# Patient Record
Sex: FEMALE | Race: WHITE | NOT HISPANIC OR LATINO | Employment: FULL TIME | URBAN - METROPOLITAN AREA
[De-identification: names, ages, dates, MRNs, and addresses within clinical notes are randomized per-mention and may not be internally consistent; named-entity substitution may affect disease eponyms.]

---

## 2019-05-28 ENCOUNTER — TELEPHONE (OUTPATIENT)
Dept: SCHEDULING | Facility: IMAGING CENTER | Age: 54
End: 2019-05-28

## 2019-07-11 ENCOUNTER — OFFICE VISIT (OUTPATIENT)
Dept: MEDICAL GROUP | Facility: PHYSICIAN GROUP | Age: 54
End: 2019-07-11
Payer: COMMERCIAL

## 2019-07-11 VITALS
BODY MASS INDEX: 42.3 KG/M2 | WEIGHT: 238.76 LBS | HEIGHT: 63 IN | RESPIRATION RATE: 14 BRPM | HEART RATE: 87 BPM | SYSTOLIC BLOOD PRESSURE: 110 MMHG | OXYGEN SATURATION: 98 % | DIASTOLIC BLOOD PRESSURE: 76 MMHG | TEMPERATURE: 98.4 F

## 2019-07-11 DIAGNOSIS — B35.3 ATHLETE'S FOOT, LEFT: ICD-10-CM

## 2019-07-11 DIAGNOSIS — N18.30 CHRONIC RENAL DISEASE, STAGE III (HCC): ICD-10-CM

## 2019-07-11 DIAGNOSIS — E66.01 MORBID OBESITY WITH BMI OF 40.0-44.9, ADULT (HCC): ICD-10-CM

## 2019-07-11 DIAGNOSIS — G56.03 CARPAL TUNNEL SYNDROME, BILATERAL: ICD-10-CM

## 2019-07-11 DIAGNOSIS — I10 ESSENTIAL HYPERTENSION: ICD-10-CM

## 2019-07-11 PROBLEM — K57.30 DIVERTICULOSIS OF LARGE INTESTINE: Status: ACTIVE | Noted: 2019-07-11

## 2019-07-11 PROBLEM — R20.0 NUMBNESS AND TINGLING IN BOTH HANDS: Status: ACTIVE | Noted: 2019-07-11

## 2019-07-11 PROBLEM — D12.6 ADENOMATOUS COLON POLYP: Status: ACTIVE | Noted: 2019-07-11

## 2019-07-11 PROBLEM — R20.2 NUMBNESS AND TINGLING IN BOTH HANDS: Status: ACTIVE | Noted: 2019-07-11

## 2019-07-11 PROCEDURE — 99204 OFFICE O/P NEW MOD 45 MIN: CPT | Performed by: FAMILY MEDICINE

## 2019-07-11 RX ORDER — FLUCONAZOLE 100 MG/1
TABLET ORAL
Qty: 6 TAB | Refills: 0 | Status: SHIPPED
Start: 2019-07-11 | End: 2020-01-28

## 2019-07-11 ASSESSMENT — PATIENT HEALTH QUESTIONNAIRE - PHQ9: CLINICAL INTERPRETATION OF PHQ2 SCORE: 0

## 2019-07-11 NOTE — PROGRESS NOTES
Complaint: Establish care     Subjective:     Cely Taylor is a 54 y.o. female here today to establish care. Previously followed by Dr. Sheriff.    Morbid obesity with BMI of 40.0-44.9, adult ([facility])  Sedentary lifestyle due to long work days.    Essential hypertension  On lisinopril 40 mg qd    Athlete's foot, left  Both she and her  have some foot fungus. He is being treated.    Numbness and tingling in both hands  For past couple months, at night, now getting almost nightly. Works at [employer] all day.    Chronic renal disease, stage III ([facility])  Followed by Dr. Hernandez.     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    Current medicines (including changes today)  Current Outpatient Prescriptions   Medication Sig Dispense Refill   • fluconazole (DIFLUCAN) 100 MG Tab Take 1 tab weekly x 6 weeks 6 Tab 0   • lisinopril (PRINIVIL, ZESTRIL) 40 MG tablet Take 40 mg by mouth every evening.     • omeprazole (PRILOSEC) 20 MG delayed-release capsule Take 20 mg by mouth as needed.       No current facility-administered medications for this visit.      She  has a past medical history of Breath shortness (3/2015); Cold (2/2015); Heart burn; Hypertension; Lipoma of neck; Other specified symptom associated with female genital organs (4/2015); productive cough (4/2015); and Renal disorder.    Health Maintenance: recently had colonoscopy. No need for Pap. Sees dentist and has eyes checked regularly.      Allergies: Ivy leaf [hedera helix]    Current Outpatient Prescriptions Ordered in [facility]   Medication Sig Dispense Refill   • fluconazole (DIFLUCAN) 100 MG Tab Take 1 tab weekly x 6 weeks 6 Tab 0   • lisinopril (PRINIVIL, ZESTRIL) 40 MG tablet Take 40 mg by mouth every evening.     • omeprazole (PRILOSEC) 20 MG delayed-release capsule Take 20 mg by mouth as needed.       No current Epic-ordered facility-administered medications on file.        Past Medical History:   Diagnosis Date   • Breath shortness 3/2015     "due to cold   • Cold 2/2015   • Heart burn    • Hypertension    • Lipoma of neck    • Other specified symptom associated with female genital organs 4/2015    fibroids   • productive cough 4/2015   • Renal disorder     CKD       Past Surgical History:   Procedure Laterality Date   • VAGINAL HYSTERECTOMY SCOPE TOTAL  4/13/2015    Performed by Kim Gardner M.D. at SURGERY SAME DAY [facility]   • SALPINGECTOMY  4/13/2015    Performed by Kim Gardner M.D. at SURGERY SAME DAY [facility]   • CYSTOSCOPY  4/13/2015    Performed by Kim Gardner M.D. at SURGERY SAME DAY [facility]       Social History   Substance Use Topics   • Smoking status: Former Smoker     Packs/day: 0.75     Years: 15.00     Types: Cigarettes     Quit date: 1/1/2000   • Smokeless tobacco: Never Used   • Alcohol use Yes      Comment: 1-2 per week       Social History     Social History Narrative   • No narrative on file       Family History   Problem Relation Age of Onset   • Hypertension Sister          ROS Positive for itchy, red feet; tingling numbness in medial fingers both hands at night, no dropping objects or loss of  strength.  Patient denies any fever, chills, unintentional weight gain/loss, fatigue, stroke symptoms, dizziness, headache, nasal congestion, sore-throat, cough, heartburn, chest pain, difficulty breathing, abdominal discomfort, diarrhea/constipation, burning with urination or frequency, joint or back pain,  depression or anxiety.       Objective:     /76   Pulse 87   Temp 36.9 °C (98.4 °F) (Temporal)   Resp 14   Ht 1.588 m (5' 2.5\")   Wt 108.3 kg (238 lb 12.1 oz)   SpO2 98%  Body mass index is 42.97 kg/m².   Physical Exam:  Constitutional: Alert, no distress.  Skin: Warm, dry, good turgor, no rashes in visible areas.  Eye: Equal, round and reactive, conjunctiva clear, lids normal.  ENMT: Lips without lesions, good dentition, oropharynx clear.  Neck: Trachea midline, no masses, no thyromegaly. No " cervical or supraclavicular lymphadenopathy. Dense sc mass 5 cm across over c7.  Respiratory: Unlabored respiratory effort, lungs clear to auscultation, no wheezes, no ronchi.  Cardiovascular: Normal S1, S2, no murmur, no extremity edema.  Abdomen: Soft, non-tender, no masses, no hepatosplenomegaly.  Psych: Alert and oriented x3, appropriate affect and mood.        Assessment and Plan:   The following treatment plan was discussed    1. Athlete's foot, left  Needs to disinfect shower.  - fluconazole (DIFLUCAN) 100 MG Tab; Take 1 tab weekly x 6 weeks  Dispense: 6 Tab; Refill: 0    2. Essential hypertension  Controlled on med.    3. Chronic renal disease, stage III (HCC)  Followed by Dr. Hernandez.    4. Carpal tunnel syndrome, bilateral  Recommend trial of wrist splints at night, may need referral to hand surgeon if persistent.    5. Morbid obesity with BMI of 40.0-44.9, adult (HCC)  - Patient identified as having weight management issue.  Appropriate orders and counseling given.    Followup: Return if symptoms worsen or fail to improve.    Please note that this dictation was created using voice recognition software. I have made every reasonable attempt to correct obvious errors, but I expect that there are errors of grammar and possibly content that I did not discover before finalizing the note.

## 2019-09-10 ENCOUNTER — OFFICE VISIT (OUTPATIENT)
Dept: MEDICAL GROUP | Facility: PHYSICIAN GROUP | Age: 54
End: 2019-09-10
Payer: COMMERCIAL

## 2019-09-10 ENCOUNTER — HOSPITAL ENCOUNTER (OUTPATIENT)
Facility: MEDICAL CENTER | Age: 54
End: 2019-09-10
Attending: FAMILY MEDICINE
Payer: COMMERCIAL

## 2019-09-10 VITALS
SYSTOLIC BLOOD PRESSURE: 110 MMHG | DIASTOLIC BLOOD PRESSURE: 70 MMHG | HEIGHT: 62 IN | OXYGEN SATURATION: 97 % | WEIGHT: 229.5 LBS | HEART RATE: 70 BPM | BODY MASS INDEX: 42.23 KG/M2 | TEMPERATURE: 97.1 F

## 2019-09-10 DIAGNOSIS — R20.2 NUMBNESS AND TINGLING IN BOTH HANDS: ICD-10-CM

## 2019-09-10 DIAGNOSIS — R20.0 NUMBNESS AND TINGLING IN BOTH HANDS: ICD-10-CM

## 2019-09-10 DIAGNOSIS — R21 RASH: ICD-10-CM

## 2019-09-10 DIAGNOSIS — N30.01 ACUTE CYSTITIS WITH HEMATURIA: ICD-10-CM

## 2019-09-10 PROBLEM — R30.0 DYSURIA: Status: ACTIVE | Noted: 2019-09-10

## 2019-09-10 LAB
APPEARANCE UR: NORMAL
BILIRUB UR STRIP-MCNC: NEGATIVE MG/DL
COLOR UR AUTO: YELLOW
GLUCOSE UR STRIP.AUTO-MCNC: NEGATIVE MG/DL
KETONES UR STRIP.AUTO-MCNC: NEGATIVE MG/DL
LEUKOCYTE ESTERASE UR QL STRIP.AUTO: NORMAL
NITRITE UR QL STRIP.AUTO: NEGATIVE
PH UR STRIP.AUTO: 5.5 [PH] (ref 5–8)
PROT UR QL STRIP: NORMAL MG/DL
RBC UR QL AUTO: NORMAL
SP GR UR STRIP.AUTO: 1.02
UROBILINOGEN UR STRIP-MCNC: NORMAL MG/DL

## 2019-09-10 PROCEDURE — 99214 OFFICE O/P EST MOD 30 MIN: CPT | Performed by: FAMILY MEDICINE

## 2019-09-10 PROCEDURE — 87086 URINE CULTURE/COLONY COUNT: CPT

## 2019-09-10 PROCEDURE — 81002 URINALYSIS NONAUTO W/O SCOPE: CPT | Performed by: FAMILY MEDICINE

## 2019-09-10 RX ORDER — NITROFURANTOIN 25; 75 MG/1; MG/1
100 CAPSULE ORAL 2 TIMES DAILY
Qty: 20 CAP | Refills: 0 | Status: SHIPPED
Start: 2019-09-10 | End: 2020-01-28

## 2019-09-10 NOTE — ASSESSMENT & PLAN NOTE
Rash in arch of left foot getting bigger, did not respond to oral fluconazole at all. Very itchy. 's rash continues as well.

## 2019-09-13 LAB
BACTERIA UR CULT: NORMAL
SIGNIFICANT IND 70042: NORMAL
SITE SITE: NORMAL
SOURCE SOURCE: NORMAL

## 2019-09-18 ENCOUNTER — APPOINTMENT (RX ONLY)
Dept: URBAN - METROPOLITAN AREA CLINIC 31 | Facility: CLINIC | Age: 54
Setting detail: DERMATOLOGY
End: 2019-09-18

## 2019-09-18 DIAGNOSIS — L30.1 DYSHIDROSIS [POMPHOLYX]: ICD-10-CM

## 2019-09-18 PROCEDURE — ? PRESCRIPTION

## 2019-09-18 PROCEDURE — 99202 OFFICE O/P NEW SF 15 MIN: CPT

## 2019-09-18 PROCEDURE — ? ADDITIONAL NOTES

## 2019-09-18 PROCEDURE — ? COUNSELING: TOPICAL STEROIDS

## 2019-09-18 PROCEDURE — ? COUNSELING

## 2019-09-18 RX ORDER — CLOBETASOL PROPIONATE 0.5 MG/G
TOPICAL OINTMENT TOPICAL
Qty: 1 | Refills: 1 | Status: ERX | COMMUNITY
Start: 2019-09-18

## 2019-09-18 RX ORDER — CLOBETASOL PROPIONATE 0.5 MG/G
1 CREAM TOPICAL BID
Qty: 1 | Refills: 3 | Status: CANCELLED
Stop reason: CLARIF

## 2019-09-18 RX ADMIN — CLOBETASOL PROPIONATE TOPICAL: 0.5 OINTMENT TOPICAL at 16:41

## 2019-09-18 ASSESSMENT — LOCATION DETAILED DESCRIPTION DERM: LOCATION DETAILED: LEFT INSTEP

## 2019-09-18 ASSESSMENT — LOCATION SIMPLE DESCRIPTION DERM: LOCATION SIMPLE: LEFT PLANTAR SURFACE

## 2019-09-18 ASSESSMENT — LOCATION ZONE DERM: LOCATION ZONE: FEET

## 2019-09-18 NOTE — HPI: RASH
How Severe Is Your Rash?: moderate
Is This A New Presentation, Or A Follow-Up?: Rash
Additional History: Patient states her and he  are passing this rash on the foot back and forth. Patient states it starts as a blister. Patient states the rash is constant for two years and only on the left foot. Patient states she saw PCP that prescribed an anti fungal. Patient denies any change from medication. Patient denies history of eczema or psoriasis.  Patient denies traveling to other countries when rash started.  The antifungal pills and creams have not helped at all.  She states she scratches until the skin comes off.

## 2019-09-18 NOTE — PROCEDURE: ADDITIONAL NOTES
Additional Notes: We discussed this may be an eczematous process, unlikely that it is fungal since she has treated it for so long.  will try clobetasol BID for two weeks, then one week break, if improving restart for two weeks,  if rash is worse stop the clobetasol.  we discussed fungus can get worse with steroids.  do not apply anything else to foot except plain vaseline if needed. call sooner if any problems.
Detail Level: Simple

## 2020-01-28 ENCOUNTER — OFFICE VISIT (OUTPATIENT)
Dept: MEDICAL GROUP | Facility: PHYSICIAN GROUP | Age: 55
End: 2020-01-28
Payer: COMMERCIAL

## 2020-01-28 VITALS
TEMPERATURE: 98.6 F | SYSTOLIC BLOOD PRESSURE: 94 MMHG | BODY MASS INDEX: 39.11 KG/M2 | OXYGEN SATURATION: 93 % | RESPIRATION RATE: 14 BRPM | HEIGHT: 62 IN | HEART RATE: 56 BPM | DIASTOLIC BLOOD PRESSURE: 64 MMHG | WEIGHT: 212.52 LBS

## 2020-01-28 DIAGNOSIS — J10.1 INFLUENZA A: ICD-10-CM

## 2020-01-28 DIAGNOSIS — I95.2 HYPOTENSION DUE TO DRUGS: ICD-10-CM

## 2020-01-28 PROBLEM — R05.9 COUGH: Status: ACTIVE | Noted: 2020-01-28

## 2020-01-28 PROBLEM — R30.0 DYSURIA: Status: RESOLVED | Noted: 2019-09-10 | Resolved: 2020-01-28

## 2020-01-28 LAB
FLUAV+FLUBV AG SPEC QL IA: NORMAL
INT CON NEG: NEGATIVE
INT CON POS: POSITIVE

## 2020-01-28 PROCEDURE — 87804 INFLUENZA ASSAY W/OPTIC: CPT | Performed by: FAMILY MEDICINE

## 2020-01-28 PROCEDURE — 99213 OFFICE O/P EST LOW 20 MIN: CPT | Performed by: FAMILY MEDICINE

## 2020-01-28 RX ORDER — ALBUTEROL SULFATE 90 UG/1
2 AEROSOL, METERED RESPIRATORY (INHALATION) EVERY 4 HOURS PRN
Qty: 1 INHALER | Refills: 2 | Status: SHIPPED | OUTPATIENT
Start: 2020-01-28

## 2020-01-29 NOTE — ASSESSMENT & PLAN NOTE
Presents with junky cough, nasal congestion, chest tightness and wheezing since last Friday. No fever or chills. Did not get flu shot this year.

## 2020-01-29 NOTE — PROGRESS NOTES
Complaint: Not feeling well, flu.     Subjective:     Cely Taylor is a 54 y.o. female here today for an acute illness.    Cough  Presents with junky cough, nasal congestion, chest tightness and wheezing since last Friday. No fever or chills. Did not get flu shot this year.     No other concerns or complaints today.    Current medicines (including changes today)  Current Outpatient Medications   Medication Sig Dispense Refill   • albuterol 108 (90 Base) MCG/ACT Aero Soln inhalation aerosol Inhale 2 Puffs by mouth every four hours as needed for Shortness of Breath. 1 Inhaler 2     No current facility-administered medications for this visit.      She  has a past medical history of Breath shortness (3/2015), Cold (2/2015), Heart burn, Hypertension, Lipoma of neck, Other specified symptom associated with female genital organs (4/2015), productive cough (4/2015), and Renal disorder.    Health Maintenance:       Allergies: Ivy leaf [hedera helix]    Current Outpatient Medications Ordered in Epic   Medication Sig Dispense Refill   • albuterol 108 (90 Base) MCG/ACT Aero Soln inhalation aerosol Inhale 2 Puffs by mouth every four hours as needed for Shortness of Breath. 1 Inhaler 2     No current Epic-ordered facility-administered medications on file.        Past Medical History:   Diagnosis Date   • Breath shortness 3/2015    due to cold   • Cold 2/2015   • Heart burn    • Hypertension    • Lipoma of neck    • Other specified symptom associated with female genital organs 4/2015    fibroids   • productive cough 4/2015   • Renal disorder     CKD       Past Surgical History:   Procedure Laterality Date   • VAGINAL HYSTERECTOMY SCOPE TOTAL  4/13/2015    Performed by Kim Gardner M.D. at SURGERY SAME DAY [facility]   • SALPINGECTOMY  4/13/2015    Performed by Kim Gardner M.D. at SURGERY SAME DAY [facility]   • CYSTOSCOPY  4/13/2015    Performed by Kim Gardner M.D. at SURGERY SAME DAY [facility]       Social  "History     Tobacco Use   • Smoking status: Former Smoker     Packs/day: 0.75     Years: 15.00     Pack years: 11.25     Types: Cigarettes     Last attempt to quit: 2000     Years since quittin.0   • Smokeless tobacco: Never Used   Substance Use Topics   • Alcohol use: Yes     Comment: 1-2 per week   • Drug use: No       Social History     Patient does not qualify to have social determinant information on file (likely too young).   Social History Narrative   • Not on file       Family History   Problem Relation Age of Onset   • Hypertension Sister          ROS Positive for cough, congestion, wheezing.  Patient denies any fever, chills, unintentional weight gain/loss, fatigue, stroke symptoms, dizziness, headache, sore-throat, heartburn, chest pain,  abdominal discomfort, diarrhea/constipation, burning with urination or frequency, joint or back pain, skin rashes, depression or anxiety.       Objective:     BP (!) 94/64 (BP Location: Right arm, Patient Position: Sitting, BP Cuff Size: Adult)   Pulse (!) 56   Temp 37 °C (98.6 °F) (Temporal)   Resp 14   Ht 1.575 m (5' 2\")   Wt 96.4 kg (212 lb 8.4 oz)   SpO2 93%  Body mass index is 38.87 kg/m².   Physical Exam:  Constitutional: Alert, no distress.  ENT: throat clear, nares congested with thick secretions.  Respiratory: Decreased BS, Unlabored respiratory effort, lungs clear to auscultation, no wheezes, no ronchi.  Cardiovascular: Normal S1, S2, no murmur, no extremity edema.            Assessment and Plan:   The following treatment plan was discussed    1. Hypotension due to drugs  Stop Prinivil, monitor BP, push fluids.    2. Influenza A  Push fluids, Mucinex DM, Vicks Nasal Spray, off work till next Monday.  - POCT Influenza A  - albuterol 108 (90 Base) MCG/ACT Aero Soln inhalation aerosol; Inhale 2 Puffs by mouth every four hours as needed for Shortness of Breath.  Dispense: 1 Inhaler; Refill: 2      Followup: Return if symptoms worsen or fail to " improve.    Please note that this dictation was created using voice recognition software. I have made every reasonable attempt to correct obvious errors, but I expect that there are errors of grammar and possibly content that I did not discover before finalizing the note.

## 2020-10-12 NOTE — LETTER
January 28, 2020        Re: Cely Taylor      The above patient is under our care. She is off work until Monday, February 3rd due to an acute illness.      Sincerely,              Tim Jaramillo M.D.       no fever and no chills.